# Patient Record
Sex: MALE | Race: WHITE | NOT HISPANIC OR LATINO | Employment: STUDENT | URBAN - METROPOLITAN AREA
[De-identification: names, ages, dates, MRNs, and addresses within clinical notes are randomized per-mention and may not be internally consistent; named-entity substitution may affect disease eponyms.]

---
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
## 2021-08-10 ENCOUNTER — OFFICE VISIT (OUTPATIENT)
Dept: URGENT CARE | Facility: MEDICAL CENTER | Age: 21
End: 2021-08-10
Payer: COMMERCIAL

## 2021-08-10 VITALS
TEMPERATURE: 97.9 F | HEIGHT: 66 IN | OXYGEN SATURATION: 97 % | WEIGHT: 138 LBS | DIASTOLIC BLOOD PRESSURE: 75 MMHG | BODY MASS INDEX: 22.18 KG/M2 | HEART RATE: 109 BPM | SYSTOLIC BLOOD PRESSURE: 132 MMHG

## 2021-08-10 DIAGNOSIS — S61.211A LACERATION OF LEFT INDEX FINGER WITHOUT FOREIGN BODY WITHOUT DAMAGE TO NAIL, INITIAL ENCOUNTER: Primary | ICD-10-CM

## 2021-08-10 PROCEDURE — 90715 TDAP VACCINE 7 YRS/> IM: CPT

## 2021-08-10 PROCEDURE — 99213 OFFICE O/P EST LOW 20 MIN: CPT | Performed by: PHYSICIAN ASSISTANT

## 2021-08-10 PROCEDURE — 90471 IMMUNIZATION ADMIN: CPT | Performed by: PHYSICIAN ASSISTANT

## 2021-08-10 PROCEDURE — 12011 RPR F/E/E/N/L/M 2.5 CM/<: CPT | Performed by: PHYSICIAN ASSISTANT

## 2021-08-10 NOTE — PATIENT INSTRUCTIONS
Do not get wet for 24 hours   keep clean, covered, dry   return in 7-10 days for suture removal    Laceration   WHAT YOU NEED TO KNOW:   A laceration is an injury to the skin and the soft tissue underneath it  Lacerations can happen anywhere on the body  DISCHARGE INSTRUCTIONS:   Return to the emergency department if:   · You have heavy bleeding or bleeding that does not stop after 10 minutes of holding firm, direct pressure over the wound  · Your wound opens up  Call your doctor if:   · You have a fever or chills  · Your laceration is red, warm, or swollen  · You have red streaks on your skin coming from your wound  · You have white or yellow drainage from the wound that smells bad  · You have pain that gets worse, even after treatment  · You have questions or concerns about your condition or care  Medicines: You may need any of the following:  · Prescription pain medicine  may be given  Ask your healthcare provider how to take this medicine safely  Some prescription pain medicines contain acetaminophen  Do not take other medicines that contain acetaminophen without talking to your healthcare provider  Too much acetaminophen may cause liver damage  Prescription pain medicine may cause constipation  Ask your healthcare provider how to prevent or treat constipation  · Antibiotics  help treat or prevent a bacterial infection  · Take your medicine as directed  Contact your healthcare provider if you think your medicine is not helping or if you have side effects  Tell him or her if you are allergic to any medicine  Keep a list of the medicines, vitamins, and herbs you take  Include the amounts, and when and why you take them  Bring the list or the pill bottles to follow-up visits  Carry your medicine list with you in case of an emergency  Care for your wound as directed:   · Do not get your wound wet  until your healthcare provider says it is okay  Do not soak your wound in water  Do not go swimming until your healthcare provider says it is okay  Carefully wash the wound with soap and water  Gently pat the area dry or allow it to air dry  · Change your bandages  when they get wet, dirty, or after washing  Apply new, clean bandages as directed  Do not apply elastic bandages or tape too tight  Do not put powders or lotions over your incision  · Apply antibiotic ointment as directed  Your healthcare provider may give you antibiotic ointment to put over your wound if you have stitches  If you have strips of tape over your incision, let them dry up and fall off on their own  If they do not fall off within 14 days, gently remove them  If you have glue over your wound, do not remove or pick at it  If your glue comes off, do not replace it with glue that you have at home  · Check your wound every day for signs of infection, such as swelling, redness, or pus  Self-care:   · Apply ice  on your wound for 15 to 20 minutes every hour or as directed  Use an ice pack, or put crushed ice in a plastic bag  Cover it with a towel  Ice helps prevent tissue damage and decreases swelling and pain  · Use a splint as directed  A splint will decrease movement and stress on your wound  It may help it heal faster  A splint may be used for lacerations over joints or areas of your body that bend  Ask your healthcare provider how to apply and remove a splint  · Decrease scarring of your wound  by applying ointments as directed  Do not apply ointments until your healthcare provider says it is okay  You may need to wait until your wound is healed  Ask which ointment to buy and how often to use it  After your wound is healed, use sunscreen over the area when you are out in the sun  You should do this for at least 6 months to 1 year after your injury  Follow up with your doctor as directed: You may need to follow up in 24 to 48 hours to have your wound checked for infection   You will need to return in 3 to 14 days if you have stitches or staples so they can be removed  Care for your wound as directed to prevent infection and help it heal  Write down your questions so you remember to ask them during your visits  © Copyright [employer] 2021 Information is for End User's use only and may not be sold, redistributed or otherwise used for commercial purposes  All illustrations and images included in CareNotes® are the copyrighted property of [employer]  or [employer]ke[employer]   The above information is an  only  It is not intended as medical advice for individual conditions or treatments  Talk to your doctor, nurse or pharmacist before following any medical regimen to see if it is safe and effective for you

## 2021-08-10 NOTE — PROGRESS NOTES
[facility]        NAME: Tierra Tavares is a 21 y o  male  : 2000    MRN: [id number]  DATE: August 10, 2021  TIME: 2:34 PM    Assessment and Plan   Laceration of left index finger without foreign body without damage to nail, initial encounter [S61 211A]  1  Laceration of left index finger without foreign body without damage to nail, initial encounter  Tdap Vaccine greater than or equal to 6yo      laceration sutured without complication  Tetanus updated in office  Advised not get wet for 24 hours and return in 7-10 days for suture removal     Patient Instructions     Tylenol or Motrin for pain   do not get wet for 24 hours   return for removal in 7-10 days  Follow up with PCP in 3-5 days  Proceed to  ER if symptoms worsen  Chief Complaint     Chief Complaint   Patient presents with    Finger Laceration     Today was cutting cheese and cut finger  History of Present Illness        Patient is a 24-year-old male who presents today with complaints of left index finger laceration  He was cutting cheese and lacerated it with a kitchen knife  No numbness or tingling  Bleeding under control  Tetanus unknown  Review of Systems   Review of Systems   Constitutional: Negative for fever  Musculoskeletal: Positive for myalgias  Skin: Positive for wound  Negative for color change  Neurological: Negative for weakness and numbness           Current Medications       Current Outpatient Medications:     acetaminophen (TYLENOL) 325 mg tablet, Take 3 tablets (975 mg total) by mouth every 8 (eight) hours as needed for mild pain (Patient not taking: Reported on 10/9/2018 ), Disp: 30 tablet, Rfl: 0    methocarbamol (ROBAXIN) 500 mg tablet, Take 1 tablet (500 mg total) by mouth every 6 (six) hours as needed for muscle spasms (Patient not taking: Reported on 10/9/2018 ), Disp: 20 tablet, Rfl: 0    Multiple Vitamins-Minerals (HM MULTIVITAMIN ADULT GUMMY PO), Take by mouth daily (Patient not taking: Reported on 8/10/2021), Disp: , Rfl:     Current Allergies     Allergies as of 08/10/2021    (No Known Allergies)            The following portions of the patient's history were reviewed and updated as appropriate: allergies, current medications, past family history, past medical history, past social history, past surgical history and problem list      History reviewed  No pertinent past medical history  History reviewed  No pertinent surgical history  Family History   Problem Relation Age of Onset    Hashimoto's thyroiditis Mother     Diabetes Maternal Aunt     Diabetes Maternal Grandmother          Medications have been verified  Objective   /75   Pulse (!) 109   Temp 97 9 °F (36 6 °C) (Temporal)   Ht 5' 6" (1 676 m)   Wt 62 6 kg (138 lb)   SpO2 97%   BMI 22 27 kg/m²        Physical Exam     Physical Exam  Constitutional:       General: He is not in acute distress  Appearance: Normal appearance  He is not ill-appearing  Cardiovascular:      Rate and Rhythm: Normal rate and regular rhythm  Pulmonary:      Effort: Pulmonary effort is normal    Musculoskeletal:         General: No swelling or deformity  Normal range of motion  Skin:     General: Skin is warm and dry  Findings: Wound present  Neurological:      Mental Status: He is alert  Laceration repair    Date/Time: 8/10/2021 2:36 PM  Performed by: Judi Yao PA-C  Authorized by: Judi Yao PA-C   Consent: Verbal consent obtained    Consent given by: patient  Patient identity confirmed: verbally with patient  Body area: upper extremity  Location details: left index finger  Laceration length: 1 cm  Foreign bodies: no foreign bodies  Tendon involvement: none  Nerve involvement: none  Vascular damage: no  Anesthesia: local infiltration    Anesthesia:  Local Anesthetic: lidocaine 2% without epinephrine  Anesthetic total: 3 mL    Wound Dehiscence:  Superficial Wound Dehiscence: simple closure      Procedure Details:  Irrigation solution: saline  Skin closure: 4-0 nylon  Number of sutures: 5  Technique: simple  Approximation: close  Approximation difficulty: simple  Dressing: 4x4 sterile gauze and gauze roll  Patient tolerance: patient tolerated the procedure well with no immediate complications

## 2021-08-12 ENCOUNTER — OFFICE VISIT (OUTPATIENT)
Dept: URGENT CARE | Facility: MEDICAL CENTER | Age: 21
End: 2021-08-12
Payer: COMMERCIAL

## 2021-08-12 VITALS
SYSTOLIC BLOOD PRESSURE: 112 MMHG | BODY MASS INDEX: 21.69 KG/M2 | RESPIRATION RATE: 18 BRPM | DIASTOLIC BLOOD PRESSURE: 70 MMHG | WEIGHT: 135 LBS | HEIGHT: 66 IN | OXYGEN SATURATION: 94 % | HEART RATE: 123 BPM | TEMPERATURE: 98.6 F

## 2021-08-12 DIAGNOSIS — Z51.89 VISIT FOR WOUND CHECK: Primary | ICD-10-CM

## 2021-08-12 PROCEDURE — 99213 OFFICE O/P EST LOW 20 MIN: CPT | Performed by: PHYSICIAN ASSISTANT

## 2021-08-12 NOTE — PROGRESS NOTES
[facility]        NAME: Marlene Piña is a 21 y o  male  : 2000    MRN: [id number]  DATE: 2021  TIME: 8:03 PM    Assessment and Plan   Visit for wound check [Z51 89]  1  Visit for wound check           Patient Instructions     1  Keep skin clean and dry  2  Motrin as needed for pain  3  Return for suture removal in 5 days  Chief Complaint     Chief Complaint   Patient presents with    Wound Check     to left hand          History of Present Illness         Juli Ortiz is a 30-year-old male presents for a wound check of his left hand after suture repair was completed 2 days prior  Patient reports he was doing well but noticed some bleeding from his hand earlier today  Patient reports the blood through 2 gauze pads and he had difficulty stopping the bleeding  He denies any new falls, trauma or injury to the wound site  Review of Systems   Review of Systems   Constitutional: Negative  Musculoskeletal: Negative  Skin: Positive for wound  Neurological: Negative for weakness and numbness  Current Medications       Current Outpatient Medications:     acetaminophen (TYLENOL) 325 mg tablet, Take 3 tablets (975 mg total) by mouth every 8 (eight) hours as needed for mild pain, Disp: 30 tablet, Rfl: 0    methocarbamol (ROBAXIN) 500 mg tablet, Take 1 tablet (500 mg total) by mouth every 6 (six) hours as needed for muscle spasms, Disp: 20 tablet, Rfl: 0    Multiple Vitamins-Minerals (HM MULTIVITAMIN ADULT GUMMY PO), Take by mouth daily , Disp: , Rfl:     Current Allergies     Allergies as of 2021    (No Known Allergies)            The following portions of the patient's history were reviewed and updated as appropriate: allergies, current medications, past family history, past medical history, past social history, past surgical history and problem list      History reviewed  No pertinent past medical history  History reviewed   No pertinent surgical history  Family History   Problem Relation Age of Onset    Hashimoto's thyroiditis Mother     Diabetes Maternal Aunt     Diabetes Maternal Grandmother          Medications have been verified  Objective   /70   Pulse (!) 123   Temp 98 6 °F (37 °C)   Resp 18   Ht 5' 6" (1 676 m)   Wt 61 2 kg (135 lb)   SpO2 94%   BMI 21 79 kg/m²   No LMP for male patient  Physical Exam     Physical Exam  Constitutional:       General: He is not in acute distress  Appearance: Normal appearance  Cardiovascular:      Rate and Rhythm: Normal rate and regular rhythm  Heart sounds: Normal heart sounds  Pulmonary:      Effort: Pulmonary effort is normal       Breath sounds: Normal breath sounds  Skin:             Area was cleaned with Betadine wash, tinsure of benzoin and Steri-Strips were used to control the bleeding

## 2021-08-18 ENCOUNTER — OFFICE VISIT (OUTPATIENT)
Dept: URGENT CARE | Facility: MEDICAL CENTER | Age: 21
End: 2021-08-18
Payer: COMMERCIAL

## 2021-08-18 VITALS
TEMPERATURE: 98.3 F | SYSTOLIC BLOOD PRESSURE: 107 MMHG | WEIGHT: 135 LBS | BODY MASS INDEX: 21.69 KG/M2 | RESPIRATION RATE: 18 BRPM | OXYGEN SATURATION: 95 % | HEART RATE: 98 BPM | DIASTOLIC BLOOD PRESSURE: 69 MMHG | HEIGHT: 66 IN

## 2021-08-18 DIAGNOSIS — Z48.02 ENCOUNTER FOR REMOVAL OF SUTURES: Primary | ICD-10-CM

## 2021-08-18 PROCEDURE — 99213 OFFICE O/P EST LOW 20 MIN: CPT | Performed by: PHYSICIAN ASSISTANT

## 2021-08-18 NOTE — PROGRESS NOTES
[facility]        NAME: Owen Johnson is a 24 y o  male  : 2000    MRN: [id number]  DATE: 2021  TIME: 11:59 AM    Assessment and Plan   Encounter for removal of sutures [Z48 02]  1  Encounter for removal of sutures           Patient Instructions     Suture removal  Follow up with PCP in 3-5 days  Proceed to  ER if symptoms worsen  Chief Complaint     Chief Complaint   Patient presents with    Suture / Staple Removal     here today to see if sutures are ready to be removed  History of Present Illness       23 y/o male post laceration  To left hand 8 days ago,  Presents for suture removal      Review of Systems   Review of Systems   Constitutional: Negative  HENT: Negative  Eyes: Negative  Respiratory: Negative  Negative for apnea, cough, choking, chest tightness, shortness of breath, wheezing and stridor  Cardiovascular: Negative  Negative for chest pain  Skin: Positive for wound  Current Medications       Current Outpatient Medications:     acetaminophen (TYLENOL) 325 mg tablet, Take 3 tablets (975 mg total) by mouth every 8 (eight) hours as needed for mild pain (Patient not taking: Reported on 2021), Disp: 30 tablet, Rfl: 0    methocarbamol (ROBAXIN) 500 mg tablet, Take 1 tablet (500 mg total) by mouth every 6 (six) hours as needed for muscle spasms (Patient not taking: Reported on 2021), Disp: 20 tablet, Rfl: 0    Multiple Vitamins-Minerals (HM MULTIVITAMIN ADULT GUMMY PO), Take by mouth daily  (Patient not taking: Reported on 2021), Disp: , Rfl:     Current Allergies     Allergies as of 2021    (No Known Allergies)            The following portions of the patient's history were reviewed and updated as appropriate: allergies, current medications, past family history, past medical history, past social history, past surgical history and problem list      History reviewed  No pertinent past medical history      History reviewed  No pertinent surgical history  Family History   Problem Relation Age of Onset    Hashimoto's thyroiditis Mother     Diabetes Maternal Aunt     Diabetes Maternal Grandmother          Medications have been verified  Objective   /69   Pulse 98   Temp 98 3 °F (36 8 °C) (Temporal)   Resp 18   Ht 5' 6" (1 676 m)   Wt 61 2 kg (135 lb)   SpO2 95%   BMI 21 79 kg/m²        Physical Exam     Physical Exam  Constitutional:       General: He is not in acute distress  Appearance: He is well-developed  He is not diaphoretic  Cardiovascular:      Rate and Rhythm: Normal rate and regular rhythm  Heart sounds: Normal heart sounds  Pulmonary:      Effort: Pulmonary effort is normal  No respiratory distress  Breath sounds: Normal breath sounds  No wheezing or rales  Chest:      Chest wall: No tenderness  Musculoskeletal:        Arms:       Cervical back: Normal range of motion and neck supple  Lymphadenopathy:      Cervical: No cervical adenopathy       area cleaned and prepped in sterile fashion and two sutures removed with no complication  Patient will return in 48 hours to remove other sutures

## 2021-08-20 ENCOUNTER — OFFICE VISIT (OUTPATIENT)
Dept: URGENT CARE | Facility: MEDICAL CENTER | Age: 21
End: 2021-08-20
Payer: COMMERCIAL

## 2021-08-20 VITALS — SYSTOLIC BLOOD PRESSURE: 112 MMHG | DIASTOLIC BLOOD PRESSURE: 70 MMHG | OXYGEN SATURATION: 94 % | HEART RATE: 78 BPM

## 2021-08-20 DIAGNOSIS — Z48.02 ENCOUNTER FOR REMOVAL OF SUTURES: Primary | ICD-10-CM

## 2021-08-20 PROCEDURE — 99213 OFFICE O/P EST LOW 20 MIN: CPT | Performed by: PHYSICIAN ASSISTANT

## 2021-08-20 NOTE — PROGRESS NOTES
[facility]        NAME: Linda Barba is a 24 y o  male  : 2000    MRN: [id number]  DATE: 2021  TIME: 2:15 PM    Assessment and Plan   Encounter for removal of sutures [Z48 02]  1  Encounter for removal of sutures           Patient Instructions     Suture removal  Follow up with PCP in 3-5 days  Proceed to  ER if symptoms worsen  Chief Complaint     Chief Complaint   Patient presents with    Suture / Staple Removal     Here today to get rest of sutures removed  History of Present Illness       23 y/o s/p laceration of hand 10 days ago, here for suture removal  Denies fever, chills, swelling, redness      Review of Systems   Review of Systems   Constitutional: Negative  HENT: Negative  Eyes: Negative  Respiratory: Negative  Negative for apnea, cough, choking, chest tightness, shortness of breath, wheezing and stridor  Cardiovascular: Negative  Negative for chest pain  Skin: Positive for wound  Current Medications       Current Outpatient Medications:     acetaminophen (TYLENOL) 325 mg tablet, Take 3 tablets (975 mg total) by mouth every 8 (eight) hours as needed for mild pain (Patient not taking: Reported on 2021), Disp: 30 tablet, Rfl: 0    methocarbamol (ROBAXIN) 500 mg tablet, Take 1 tablet (500 mg total) by mouth every 6 (six) hours as needed for muscle spasms (Patient not taking: Reported on 2021), Disp: 20 tablet, Rfl: 0    Multiple Vitamins-Minerals (HM MULTIVITAMIN ADULT GUMMY PO), Take by mouth daily  (Patient not taking: Reported on 2021), Disp: , Rfl:     Current Allergies     Allergies as of 2021    (No Known Allergies)            The following portions of the patient's history were reviewed and updated as appropriate: allergies, current medications, past family history, past medical history, past social history, past surgical history and problem list      No past medical history on file      No past surgical history on file  Family History   Problem Relation Age of Onset    Hashimoto's thyroiditis Mother     Diabetes Maternal Aunt     Diabetes Maternal Grandmother          Medications have been verified  Objective   /70   Pulse 78   SpO2 94%        Physical Exam     Physical Exam  Constitutional:       General: He is not in acute distress  Appearance: He is well-developed  He is not diaphoretic  Cardiovascular:      Rate and Rhythm: Normal rate and regular rhythm  Heart sounds: Normal heart sounds  Pulmonary:      Effort: Pulmonary effort is normal  No respiratory distress  Breath sounds: Normal breath sounds  No wheezing or rales  Chest:      Chest wall: No tenderness  Musculoskeletal:      Cervical back: Normal range of motion and neck supple  Lymphadenopathy:      Cervical: No cervical adenopathy             Area cleaned and prepped and sutures removed with no complications